# Patient Record
Sex: MALE | Race: WHITE | URBAN - METROPOLITAN AREA
[De-identification: names, ages, dates, MRNs, and addresses within clinical notes are randomized per-mention and may not be internally consistent; named-entity substitution may affect disease eponyms.]

---

## 2018-06-26 ENCOUNTER — HOSPITAL ENCOUNTER (EMERGENCY)
Facility: MEDICAL CENTER | Age: 78
End: 2018-06-27
Attending: EMERGENCY MEDICINE
Payer: MEDICARE

## 2018-06-26 DIAGNOSIS — I46.9 CARDIAC ARREST (HCC): ICD-10-CM

## 2018-06-26 PROCEDURE — 31500 INSERT EMERGENCY AIRWAY: CPT

## 2018-06-26 PROCEDURE — 302214 INTUBATION BOX: Performed by: EMERGENCY MEDICINE

## 2018-06-26 PROCEDURE — 99285 EMERGENCY DEPT VISIT HI MDM: CPT

## 2018-06-26 PROCEDURE — 92950 HEART/LUNG RESUSCITATION CPR: CPT

## 2018-06-26 PROCEDURE — 700111 HCHG RX REV CODE 636 W/ 250 OVERRIDE (IP): Performed by: EMERGENCY MEDICINE

## 2018-06-26 RX ORDER — CALCIUM CHLORIDE 100 MG/ML
INJECTION INTRAVENOUS; INTRAVENTRICULAR
Status: COMPLETED | OUTPATIENT
Start: 2018-06-26 | End: 2018-06-26

## 2018-06-26 RX ADMIN — EPINEPHRINE 1 MG: 0.1 INJECTION, SOLUTION ENDOTRACHEAL; INTRACARDIAC; INTRAVENOUS at 21:04

## 2018-06-26 RX ADMIN — EPINEPHRINE 1 MG: 0.1 INJECTION, SOLUTION ENDOTRACHEAL; INTRACARDIAC; INTRAVENOUS at 21:01

## 2018-06-26 RX ADMIN — SODIUM BICARBONATE 50 MEQ: 84 INJECTION, SOLUTION INTRAVENOUS at 21:04

## 2018-06-26 RX ADMIN — EPINEPHRINE 1 MG: 0.1 INJECTION, SOLUTION ENDOTRACHEAL; INTRACARDIAC; INTRAVENOUS at 21:07

## 2018-06-26 RX ADMIN — CALCIUM CHLORIDE 1 G: 100 INJECTION, SOLUTION INTRAVENOUS at 21:06

## 2018-06-27 NOTE — ED NOTES
EMS Report: Patient was found down by security at the [facility] at 2025. Unwitnessed event, unknown amount of down time prior to 2025.  Patient was found to be in PEA at 2036, patient received 3 rounds of CPR PTA.  Patient was in vfib at 2044, no shock per EMS.  Patient received 3 rounds of epinephrine at 2038, 2042, and 2052 and amiodorone 300mg x1 dose at 2044.  Patient received a right leg IO.  EMS reported a hx of COPD and Afib.  Patient was found to be incontinent of urine.         2059: Patient arrival to room per EMS with CPR in progress.    2101: One dose epi given by Kimberlyn ARGUELLES  2102: 1 amp epi given by Kimberlyn ARGUELLES. Patient intubated in first attempt by Dr. Dugan with 8.0 ETT tube at 25 at the lip.  First pulse check no pulse, asystole on the monitor.  CPR resumed  2104: 1 amp bicarb given by Kimberlyn ARGUELLES.  Pulse check, no pulse, asystole on monitor.  CPR resumed  2106: 1 amp CaCl given by Abiodun ARGUELLES  2107: one dose epi given by Kimberlyn ARGUELLES  2108: Pulse check, no pulse, asystole on the monitor.  Cardiac ultrasound in process by Dr. Dugan  2110: No cardiac motion on ultrasound.  Time of death called by Dr. Dugan    Staff present during code:  Jaswinder Núñez RT  Dr. Ritchie Mariano pharmD  [facility] ED tech  Taisha ED tech
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
## 2018-06-27 NOTE — ED PROVIDER NOTES
ED Provider Note    CHIEF COMPLAINT  No chief complaint on file.  Unresponsive    HPI  Event Fifty-Seven is a 89 y.o. unknown who presents after the patient was found unresponsive by [employer] security. According to EMS the patient was down for approximately 20 minutes before they arrived. On arrival the patient was in PA. They started resuscitation and state they did have return of spontaneous circulation for brief period of time in route however was very brief and on arrival patient active CPR in progress with bagged valve mask ventilation. No further history could be obtained.    REVIEW OF SYSTEMS  See HPI for further details. Unattainable.     PAST MEDICAL HISTORY  No past medical history on file.    SOCIAL HISTORY  Social History     Social History   • Marital status: N/A     Spouse name: N/A   • Number of children: N/A   • Years of education: N/A     Social History Main Topics   • Smoking status: Not on file   • Smokeless tobacco: Not on file   • Alcohol use Not on file   • Drug use: Unknown   • Sexual activity: Not on file     Other Topics Concern   • Not on file     Social History Narrative   • No narrative on file           PHYSICAL EXAM  VITAL SIGNS: The patient did not have any follow heart tones normal pulse on arrival, he did have agonal respirations  Constitutional: Unresponsive with CPR in progress with active bag mouth ventilation  HENT: Normocephalic, Atraumatic, tympanic membranes are intact and nonerythematous bilaterally, Oropharynx with oral gastric secretions, Nose normal.   Eyes: The pulses are fixed and dilated with no corneal reflex  Neck: Supple without meningismus  Lymphatic: No lymphadenopathy noted.   Cardiovascular: No audible heart tones.   Thorax & Lungs: Agonal respirations with diminished breath sounds bilaterally and diffuse rhonchi.   Abdomen: Distention with hypoactive bowel sounds  Skin: Diffuse pallor.    Genitalia: External genitalia appear normal, No masses or lesions. No  discharge.   Extremities: Atraumatic with no distal pulses  Neurologic: GCS of 3      PROCEDURES endotracheal tube placement  Intubation Procedure Note    Indication: Respiratory failure    Consent: Unable to be obtained due to the emergent nature of this procedure.    Medications Used: None    Procedure: The patient was placed in the appropriate position.  Cricoid pressure was not required.  Intubation was performed by indirect laryngoscopy and an 8.0 cuffed endotracheal tube.  The cuff was then inflated and the tube was secured appropriately at a distance of 23 cm to the dental ridge.  Initial confirmation of placement included bilateral breath sounds, an end tidal CO2 detector, absence of sounds over the stomach, tube fogging and adequate chest rise.  A chest x-ray to verify correct placement of the tube has been ordered but is still pending.    The patient tolerated the procedure well.     Complications: None            COURSE & MEDICAL DECISION MAKING  Pertinent Labs & Imaging studies reviewed. (See chart for details)  This a gentleman who presents in cardiac arrest. The patient was in asystole on arrival in the emergency department and we never regained any organized rhythm on the monitor nor did he have return of spontaneous circulation. He is treated aggressively for reversible causes of the asystole and he had epinephrine every 3-5 minutes followed by some sodium bicarbonate for possible acidosis. We also give the patient calcium in case this was from hyperkalemia. The patient was intubated for hypoxemia. The medicine was administered through an intraosseous line at the end of resuscitation the patient did have a peripheral line in place. At no time the patient have return of spontaneous circulation and an ultrasound was utilized and the patient did not have any cardiac function. Therefore the time of death was called at 21:10. At this time the patient's pupils were fixed and dilated, he did not have any  corneal reflex, he did not respond to sternal rub, he had no audible heart tones and no cardiac function with the ultrasound.    FINAL IMPRESSION  1. Status post cardiac arrest   2. CPR  3. Endotracheal tube placement  4. Time of death 21:10       Electronically signed by: Elliott Dugan, 6/26/2018 9:22 PM

## 2018-06-27 NOTE — DISCHARGE PLANNING
Medical Social Work    MSW received a call from pt's family friend, Svetlana who states that she's driving pt's wife to the ER and should be here around 2300.  She states that she can be reached at: [phone] as needed.    Plan: Pending arrival of pt's wife.

## 2018-06-27 NOTE — DISCHARGE PLANNING
Medical Social Work    MSW escorted pt's wife, Kurtis, pt's wifes daughter and family friend, Svetlana to Family Support Room where ERP updated them on pt's death.  Pt's family was able to see pt and were provided with emotional support.  Pt's wife was provided with Helpful Information Brochure and ER SW business card.  Pt's wife chose [facility] in [place]; NAM Cecily updated on mortuary choice.  Pt's family was provided with all pt belongings including oxygen tanks and pt's wallet from safe keeping.  Pt's family was then escorted back to ER Lobby to return home.  Bedside RN updated.    Plan: SW will remain available as needed for pt's family.